# Patient Record
(demographics unavailable — no encounter records)

---

## 2025-03-17 NOTE — HISTORY OF PRESENT ILLNESS
[FreeTextEntry1] : Dhiraj is a 51-year-old gentleman HTN, HLD, CAD s/p IWMI 2/25 at [facility] who presents for f/u care   History of rotator cuff tear requiring surgery in 12/24. On Feb 3 presented to [facility] with CP inflat MI with occl OM s/p stent then staged LAD stent 3 weeks later. EF 40-45% He runs, does martial arts, vegan diet for years. Works at [facility]. Wife psychiatrist at [employer] [facility]. takes alot of TUMS for several months and now takes them he thinks to prevent reflux, Had EGD years ago that was negative.

## 2025-03-17 NOTE — DISCUSSION/SUMMARY
[FreeTextEntry1] : The patient is a 41-year-old gentleman s/p rotator cuff repair, s/p IWMI with Cx and LAD/D1 stent who is recovering well.  #1 CV- all records reviewed, EF 40-45%, f/u ECHO 3 months, currently in cardiac rehab, may progress based on fitness before, c/w DAPT #2 HTN- c/w Entresto 24/26 and metoprolol at 25mg for now but consider decrease to 12.5mg #3 HLD- c/w atorvastatin 80mg, lipid panel and Lpa this AM, low threshold to PCSK9 #4 Ortho- pain after recent surgery may have masked his symptoms #5 GERD- chronic problem, would f/u with GI and if need for TUMS persist then sign cardiac ischemia in him.  [EKG obtained to assist in diagnosis and management of assessed problem(s)] : EKG obtained to assist in diagnosis and management of assessed problem(s)

## 2025-03-17 NOTE — HISTORY OF PRESENT ILLNESS
[FreeTextEntry1] : Dhiraj is a 51-year-old gentleman HTN, HLD, CAD s/p IWMI 2/25 at [facility] who presents for f/u care   History of rotator cuff tear requiring surgery in 12/24. On Feb 3 presented to [facility] with CP inflat MI with occl OM s/p stent then staged LAD stent 3 weeks later. EF 40-45% He runs, does martial arts, vegan diet for years. Works at [facility]. Wife psychiatrist at [employer]. takes alot of TUMS for several months and now takes them he thinks to prevent reflux, Had EGD years ago that was negative.